# Patient Record
Sex: MALE | Race: WHITE | Employment: UNEMPLOYED | URBAN - METROPOLITAN AREA
[De-identification: names, ages, dates, MRNs, and addresses within clinical notes are randomized per-mention and may not be internally consistent; named-entity substitution may affect disease eponyms.]

---

## 2017-03-06 ENCOUNTER — TELEPHONE (OUTPATIENT)
Dept: NURSING | Facility: CLINIC | Age: 32
End: 2017-03-06

## 2017-03-06 NOTE — TELEPHONE ENCOUNTER
"Call Type: Triage Call    Presenting Problem: Patient is having \"vomiting with blood.\"  Patient  states blood in vomit is only a scant amount, no other symptoms  other than frequent stools. Triaged for gastrointestinal  bleeding/Disposition to see provider within 8 hours.  Triage Note:  Guideline Title: Gastrointestinal Bleeding ; Nausea or Vomiting  Recommended Disposition: See Provider within 8 Hours  Original Inclination: Wanted to speak with a nurse  Override Disposition:  Intended Action: See Dr/Make Appt  Physician Contacted: No  Streaks of blood or scant blood in vomitus ?  YES  New or worsening signs and symptoms that may indicate shock ? NO  Vomited blood after nosebleed ? NO  Receiving or recently completed chemotherapy or radiation therapy AND more than  one episode of black or tarry stool, not blood streaked ? NO  Rectal foreign body with any amount of bright red bleeding ? NO  Following ingestion of toxic or caustic substance ? NO  Passing red, black or tarry material from rectum AND onset of new signs and  symptoms of hypovolemia ? NO  Unbearable abdominal/pelvic pain ? NO  Vomiting red, bloody or coffee-ground material, more than streaks of blood or  scant amount (not following nosebleed within past day) ? NO  One or more episodes of rectal bleeding (more than scant) and no symptoms of  hypovolemia ? NO  Chest discomfort associated with shortness of breath, sweating, odd heartbeats or  different heart rate, nausea, vomiting, lightheadedness, or fainting lasting 5 or  more minutes now or within the last hour ? NO  Chest pain spreading to the shoulders, neck, jaw, in one or both arms, stomach or  back lasting 5 or more minutes now or within the last hour. Pain is NOT  associated with taking a deep breath or a productive cough, movement, or touch to  a localized area. ? NO  Pressure, fullness, squeezing sensation or pain anywhere in the chest lasting 5 or  more minutes now or within the last hour. Pain " is NOT associated with taking a  deep breath or a productive cough, movement, or touch to a localized area on the  chest. ? NO  Bloody diarrhea AND has not been evaluated ? NO  Bleeding began after any gastrointestinal (GI) surgery or procedure and is lasting  longer than defined in provider specified discharge information ? NO  History of esophageal varices AND more than one episode of black or tarry stool ?  NO  Eight hours or less following abdominal or rectal injury AND having any amount  bright red rectal bleeding ? NO  Possible or known pregnancy ? NO  New or worsening signs and symptoms that may indicate shock ? NO  Following ingestion of toxic or caustic substance ? NO  Vomiting red, bloody or coffee-ground material, more than streaks of blood or  scant amount (not following nosebleed within past day) ? NO  Sudden onset vomiting following ingestion or inhalation overdose (accidental or  intentional) of illegal, prescription, nonprescription or alternative drugs ? NO  Any other cardiac signs/symptoms for more than 5 minutes, now or within last hour.  Pain is NOT associated with taking a deep breath or a productive cough, movement,  or touch to a localized area on the chest or upper body. ? NO  Vomiting associated with sudden onset of focal neurological changes (difficulty  speaking, numbness, weakness, paralysis, loss of coordination, change in vision  such as double vision or loss of visual field) ? NO  High to low (but not zero) risk of exposure to Ebola within the past 21 days ? NO  Physician Instructions:  Care Advice: Avoid drinking alcohol.  Call  if patient develops sudden shortness of breath, vomiting blood  or coffee ground material, pain in chest, neck, jaw, arm, or upper back, or  signs of shock.  Continue all prescription medication as recommended until evaluated by  provider.  Do not take aspirin, ibuprofen, ketoprofen, naproxen, etc., or other pain  relieving medications until consulting  "with provider.  CAUTIONS  SYMPTOM / CONDITION MANAGEMENT  Call provider immediately if start to vomit larger amounts of blood or  \"coffee-ground\" looking material.  List, or take, all current prescription(s), nonprescription or alternative  medication(s) to provider for evaluation.  Consider nonprescription antacids (Tums, Maalox, Mylanta, Gaviscon,  Rolaids, etc), H-2-receptor blockers (Pepcid AC, Zantac 75, Tagamet HB,  Axid HR), or a proton pump inhibitor (Prilosec OTC, Prevacid 24 HR, Zegerid  OTC) after reviewing the package information or discussing with a  pharmacist or your provider to determine which one would be safest for you  and your situation.  "

## 2017-03-16 ENCOUNTER — COMMUNICATION - HEALTHEAST (OUTPATIENT)
Dept: SCHEDULING | Facility: CLINIC | Age: 32
End: 2017-03-16

## 2018-05-02 ENCOUNTER — HOSPITAL ENCOUNTER (EMERGENCY)
Facility: CLINIC | Age: 33
Discharge: HOME | End: 2018-05-02
Attending: EMERGENCY MEDICINE | Admitting: EMERGENCY MEDICINE

## 2018-05-02 VITALS
BODY MASS INDEX: 24.36 KG/M2 | OXYGEN SATURATION: 95 % | TEMPERATURE: 98.2 F | RESPIRATION RATE: 18 BRPM | DIASTOLIC BLOOD PRESSURE: 91 MMHG | HEART RATE: 90 BPM | HEIGHT: 71 IN | WEIGHT: 174 LBS | SYSTOLIC BLOOD PRESSURE: 131 MMHG

## 2018-05-02 DIAGNOSIS — R40.4 TRANSIENT ALTERATION OF AWARENESS: ICD-10-CM

## 2018-05-02 DIAGNOSIS — F10.920 ALCOHOL INTOXICATION, UNCOMPLICATED (H): ICD-10-CM

## 2018-05-02 LAB
AMPHETAMINES UR QL SCN: NEGATIVE
ANION GAP SERPL CALCULATED.3IONS-SCNC: 8 MMOL/L (ref 3–14)
APAP SERPL-MCNC: <2 MG/L (ref 10–20)
BARBITURATES UR QL: NEGATIVE
BENZODIAZ UR QL: NEGATIVE
BUN SERPL-MCNC: 9 MG/DL (ref 7–30)
CALCIUM SERPL-MCNC: 8.4 MG/DL (ref 8.5–10.1)
CANNABINOIDS UR QL SCN: NEGATIVE
CHLORIDE SERPL-SCNC: 111 MMOL/L (ref 94–109)
CO2 SERPL-SCNC: 24 MMOL/L (ref 20–32)
COCAINE UR QL: NEGATIVE
CREAT SERPL-MCNC: 1.16 MG/DL (ref 0.66–1.25)
ETHANOL SERPL-MCNC: 0.22 G/DL
GFR SERPL CREATININE-BSD FRML MDRD: 73 ML/MIN/1.7M2
GLUCOSE SERPL-MCNC: 117 MG/DL (ref 70–99)
INTERPRETATION ECG - MUSE: NORMAL
OPIATES UR QL SCN: NEGATIVE
PCP UR QL SCN: NEGATIVE
POTASSIUM SERPL-SCNC: 4.3 MMOL/L (ref 3.4–5.3)
SODIUM SERPL-SCNC: 143 MMOL/L (ref 133–144)

## 2018-05-02 PROCEDURE — 99285 EMERGENCY DEPT VISIT HI MDM: CPT | Mod: 25

## 2018-05-02 PROCEDURE — 96360 HYDRATION IV INFUSION INIT: CPT

## 2018-05-02 PROCEDURE — 25000128 H RX IP 250 OP 636: Performed by: EMERGENCY MEDICINE

## 2018-05-02 PROCEDURE — 80307 DRUG TEST PRSMV CHEM ANLYZR: CPT | Performed by: EMERGENCY MEDICINE

## 2018-05-02 PROCEDURE — 96361 HYDRATE IV INFUSION ADD-ON: CPT

## 2018-05-02 PROCEDURE — 93005 ELECTROCARDIOGRAM TRACING: CPT

## 2018-05-02 PROCEDURE — 80329 ANALGESICS NON-OPIOID 1 OR 2: CPT | Performed by: EMERGENCY MEDICINE

## 2018-05-02 PROCEDURE — 80048 BASIC METABOLIC PNL TOTAL CA: CPT | Performed by: EMERGENCY MEDICINE

## 2018-05-02 PROCEDURE — 80320 DRUG SCREEN QUANTALCOHOLS: CPT | Performed by: EMERGENCY MEDICINE

## 2018-05-02 RX ADMIN — SODIUM CHLORIDE 1000 ML: 9 INJECTION, SOLUTION INTRAVENOUS at 16:03

## 2018-05-02 NOTE — ED NOTES
Bed: [facility]  Expected date:   Expected time:   Means of arrival:   Comments:  herbie - 519 - 33M [address]to[address]

## 2018-05-02 NOTE — ED AVS SNAPSHOT
Emergency Department    [address] [facility] [address]    Phone:  [phone]    Fax:  [phone]                                       Ze Bustamante   MRN: [id number]    Department:   Emergency Department   Date of Visit:  5/2/2018           Patient Information     Date Of Birth          1985        Your diagnoses for this visit were:     Alcohol intoxication, uncomplicated (H)     Transient alteration of awareness        You were seen by Merna Foster MD and Juanito Donnelly MD.      Follow-up Information     Follow up with Sabas Bryan MD. Schedule an appointment as soon as possible for a visit in 2 days.    Specialty:  Family Practice    Contact information:    [address]  [address]  [facility] [address] [phone]          Discharge Instructions       Please stop drinking alcohol.  This is harmful to your health.  Return to the ED if you want help with your alcohol use.  Follow up with your PCP in 1 week.      Discharge Instructions  Alcohol Intoxication    You have been seen today with alcohol intoxication. This means that you have enough alcohol in your system to impair your ability to mentally and physically function, perhaps to the extent that you were unable to care for yourself.    Generally, every Emergency Department visit should have a follow-up clinic visit with either a primary or a specialty clinic/provider. Please follow-up as instructed by your emergency provider today.    You may have come to the Emergency Department because of your intoxication, or for another reason, such as because of an injury. No matter what the case is, this visit is a  red flag  regarding alcohol use, and you should consider whether your drinking pattern is a problem for you.     You may be at risk for alcohol-related problems if:      Men: you drink more than 14 drinks per week, or more than 4 drinks per occasion.      Women: you drink more than 7 drinks per week or  more than 3 drinks per occasion.      You have black-outs.    You do things you regret while drinking.    You have legal problems because of drinking.    You have job problems because of drinking (you call in sick to work because of drinking).    CAGE Questions    Have you ever felt you should cut down on your drinking?    Have people annoyed you by criticizing your drinking?    Have you ever felt bad or guilty about your drinking?    Have you ever had a drink first thing in the morning to steady your nerves or get rid of a hangover (eye opener)?    If you answer yes to any of the CAGE questions, you may have a problem with alcohol.      Return to the Emergency Department if:    You become shaky or tremble when you try to stop drinking.     You have severe abdominal pain (belly pain).     You have a seizure or pass out.      You vomit (throw up) blood or have blood in your stool. This may be bright red or it may look like black coffee grounds.    You become lightheaded or faint.      For further help, contact:     Your caregiver.      Alcoholics Anonymous (AA).    o [facility]: [phone]  o [facility] Central Office: [phone]     A drug or alcohol rehabilitation program.      You can get information on alcohol resources and groups by calling the number 626 or [phone] on any phone.     Seek medical care if:    You have persistent vomiting.     You have persistent pain in any part of your body.      You do not feel better after a few days.    If you were given a prescription for medicine here today, be sure to read all of the information (including the package insert) that comes with your prescription.  This will include important information about the medicine, its side effects, and any warnings that you need to know about.  The pharmacist who fills the prescription can provide more information and answer questions you may have about the medicine.  If you have  questions or concerns that the pharmacist cannot address, please call or return to the Emergency Department.   Remember that you can always come back to the Emergency Department if you are not able to see your regular doctor in the amount of time listed above, if you get any new symptoms, or if there is anything that worries you.      24 Hour Appointment Hotline       To make an appointment at any [facility], call [phone] ([phone]). If you don't have a family doctor or clinic, we will help you find one. The [facility] are conveniently located to serve the needs of you and your family.             Review of your medicines      Our records show that you are taking the medicines listed below. If these are incorrect, please call your family doctor or clinic.        Dose / Directions Last dose taken    CLONAZEPAM PO        Refills:  0        HYDROXYZINE HCL PO        Refills:  0        MIRTAZAPINE PO        Take by mouth At Bedtime   Refills:  0        PROPRANOLOL HCL PO        Refills:  0        QUETIAPINE FUMARATE PO        Refills:  0        VENLAFAXINE HCL PO        Refills:  0                Procedures and tests performed during your visit     Acetaminophen level    Alcohol ethyl    Basic metabolic panel    Drug abuse screen 77 urine (FL, RH, SH)    EKG 12-lead, tracing only      Orders Needing Specimen Collection     None      Pending Results     No orders found from 4/30/2018 to 5/3/2018.            Pending Culture Results     No orders found from 4/30/2018 to 5/3/2018.            Pending Results Instructions     If you had any lab results that were not finalized at the time of your Discharge, you can call the ED Lab Result RN at [phone]. You will be contacted by this team for any positive Lab results or changes in treatment. The nurses are available 7 days a week from 10A to 6:30P.  You can leave a message 24 hours per day and they will return your call.        Test Results From Your  Hospital Stay        5/2/2018  2:45 PM      Component Results     Component Value Ref Range & Units Status    Sodium 143 133 - 144 mmol/L Final    Potassium 4.3 3.4 - 5.3 mmol/L Final    Chloride 111 (H) 94 - 109 mmol/L Final    Carbon Dioxide 24 20 - 32 mmol/L Final    Anion Gap 8 3 - 14 mmol/L Final    Glucose 117 (H) 70 - 99 mg/dL Final    Urea Nitrogen 9 7 - 30 mg/dL Final    Creatinine 1.16 0.66 - 1.25 mg/dL Final    GFR Estimate 73 >60 mL/min/1.7m2 Final    Non  GFR Calc    GFR Estimate If Black 89 >60 mL/min/1.7m2 Final    African American GFR Calc    Calcium 8.4 (L) 8.5 - 10.1 mg/dL Final         5/2/2018  3:22 PM      Component Results     Component Value Ref Range & Units Status    Acetaminophen Level <2 mg/L Final    Therapeutic range: 10-20 mg/L         5/2/2018  2:45 PM      Component Results     Component Value Ref Range & Units Status    Ethanol g/dL 0.22 (H) <0.01 g/dL Final         5/2/2018  7:35 PM      Component Results     Component Value Ref Range & Units Status    Amphetamine Qual Urine Negative NEG^Negative Final    Cutoff for a negative amphetamine is 500 ng/mL or less.    Barbiturates Qual Urine Negative NEG^Negative Final    Cutoff for a negative barbiturate is 200 ng/mL or less.    Benzodiazepine Qual Urine Negative NEG^Negative Final    Cutoff for a negative benzodiazepine is 200 ng/mL or less.    Cannabinoids Qual Urine Negative NEG^Negative Final    Cutoff for a negative cannabinoid is 50 ng/mL or less.    Cocaine Qual Urine Negative NEG^Negative Final    Cutoff for a negative cocaine is 300 ng/mL or less.    Opiates Qualitative Urine Negative NEG^Negative Final    Cutoff for a negative opiate is 300 ng/mL or less.    PCP Qual Urine Negative NEG^Negative Final    Cutoff for a negative PCP is 25 ng/mL or less.                Clinical Quality Measure: Blood Pressure Screening     Your blood pressure was checked while you were in the emergency department today. The last  "reading we obtained was  BP: (!) 131/91 . Please read the guidelines below about what these numbers mean and what you should do about them.  If your systolic blood pressure (the top number) is less than 120 and your diastolic blood pressure (the bottom number) is less than 80, then your blood pressure is normal. There is nothing more that you need to do about it.  If your systolic blood pressure (the top number) is 120-139 or your diastolic blood pressure (the bottom number) is 80-89, your blood pressure may be higher than it should be. You should have your blood pressure rechecked within a year by a primary care provider.  If your systolic blood pressure (the top number) is 140 or greater or your diastolic blood pressure (the bottom number) is 90 or greater, you may have high blood pressure. High blood pressure is treatable, but if left untreated over time it can put you at risk for heart attack, stroke, or kidney failure. You should have your blood pressure rechecked by a primary care provider within the next 4 weeks.  If your provider in the emergency department today gave you specific instructions to follow-up with your doctor or provider even sooner than that, you should follow that instruction and not wait for up to 4 weeks for your follow-up visit.        Thank you for choosing [facility]       Thank you for choosing [facility] for your care. Our goal is always to provide you with excellent care. Hearing back from our patients is one way we can continue to improve our services. Please take a few minutes to complete the written survey that you may receive in the mail after you visit with us. Thank you!        ShowMe [employer] Information     [employer] lets you send messages to your doctor, view your test results, renew your prescriptions, schedule appointments and more. To sign up, go to [email or web address] . Click on \"Log in\" on the left side of the screen, which will take you to the Welcome page. Then click on \"Sign " "up Now\" on the right side of the page.     You will be asked to enter the access code listed below, as well as some personal information. Please follow the directions to create your username and password.     Your access code is: [email or web address]  Expires: 2018  8:04 PM     Your access code will  in 90 days. If you need help or a new code, please call your [facility] clinic or [phone].        Care EveryWhere ID     This is your Care EveryWhere ID. This could be used by other organizations to access your [facility] medical records  [id number]        Equal Access to Services     [facility] : Solitario Hope, danny jones, georgie charlton, tran hernandez . So [facility] [phone].    ATENCIÓN: Si habla español, tiene a dejesus disposición servicios gratuitos de asistencia lingüística. Llame al [phone].    We comply with applicable federal civil rights laws and [place] laws. We do not discriminate on the basis of race, color, national origin, age, disability, sex, sexual orientation, or gender identity.            After Visit Summary       This is your record. Keep this with you and show to your community pharmacist(s) and doctor(s) at your next visit.                  "

## 2018-05-02 NOTE — ED PROVIDER NOTES
"[facility] ED Behavioral Health Handoff Note:       Brief HPI:  This is a 33 year old male signed out to me by Dr. Foster .  See initial ED Provider note for details of the presentation.     Patient is not medically cleared for admission to a Behavioral Health unit.      [place] airport.  Took psych medications with plenty of alcohol.  Unclear intent to harm self or others.  Refused by [facility].  Was going to [employer] for work related event.      Pending studies include none.      The patient is on a hold.  The type of hold is FANTASMA.      The patient has not required medication for agitation.      Exam:   Patient Vitals for the past 24 hrs:   BP Temp Temp src Pulse Resp SpO2 Height Weight   05/02/18 1900 - - - - - 98 % - -   05/02/18 1800 95/58 - - 83 - 96 % - -   05/02/18 1740 - - - 89 18 96 % - -   05/02/18 1600 98/63 - - 88 16 94 % - -   05/02/18 1500 99/63 - - 86 - 95 % - -   05/02/18 1458 102/67 - - 89 14 94 % - -   05/02/18 1300 - - - 94 14 - - -   05/02/18 1258 - - - - - 91 % - -   05/02/18 1257 - - - - - 91 % - -   05/02/18 1256 - - - - - 91 % - -   05/02/18 1255 - - - - - 91 % - -   05/02/18 1254 - - - - - 91 % - -   05/02/18 1237 (!) 131/99 98.2  F (36.8  C) Oral 99 16 94 % 1.803 m (5' 11\") 78.9 kg (174 lb)     General: Resting comfortably on the gurney  Head:  The scalp, face, and head appear normal  Eyes:  The pupils are normal    Conjunctivae and sclera appear normal  ENT:    The nose is normal    Ears/pinnae are normal  Neck:  Normal range of motion  MS:  No defomities to extremities.  Skin:  No rash or lesions noted.  Neuro: Gait normal. Speech is normal and fluent  Psych:  Awake. Alert.  Normal affect.      Appropriate interactions    ED Course:    There were no significant events while under my care.        Patient clinically sobered here in the emergency department and was appropriate for discharge after appropriate oral challenge.  Patient discharged home with plans for counseling with primary care to discuss " alcohol cessation techniques.  All questions answered prior to discharge.  Discharged home.    Impression:    ICD-10-CM    1. Alcohol intoxication, uncomplicated (H) F10.920 Drug abuse screen 77 urine (FL, RH, SH)   2. Transient alteration of awareness R40.4        Plan:    1.Recheck SI/HI once sober.  No DEC evaluation needed unless concern for SI.  Likely discharge home      RESULTS:   Results for orders placed or performed during the hospital encounter of 05/02/18 (from the past 24 hour(s))   EKG 12-lead, tracing only     Status: None    Collection Time: 05/02/18  1:57 PM   Result Value Ref Range    Interpretation ECG Click View Image link to view waveform and result    Basic metabolic panel     Status: Abnormal    Collection Time: 05/02/18  2:05 PM   Result Value Ref Range    Sodium 143 133 - 144 mmol/L    Potassium 4.3 3.4 - 5.3 mmol/L    Chloride 111 (H) 94 - 109 mmol/L    Carbon Dioxide 24 20 - 32 mmol/L    Anion Gap 8 3 - 14 mmol/L    Glucose 117 (H) 70 - 99 mg/dL    Urea Nitrogen 9 7 - 30 mg/dL    Creatinine 1.16 0.66 - 1.25 mg/dL    GFR Estimate 73 >60 mL/min/1.7m2    GFR Estimate If Black 89 >60 mL/min/1.7m2    Calcium 8.4 (L) 8.5 - 10.1 mg/dL   Acetaminophen level     Status: None    Collection Time: 05/02/18  2:05 PM   Result Value Ref Range    Acetaminophen Level <2 mg/L   Alcohol ethyl     Status: Abnormal    Collection Time: 05/02/18  2:05 PM   Result Value Ref Range    Ethanol g/dL 0.22 (H) <0.01 g/dL   Drug abuse screen 77 urine (FL, RH, SH)     Status: None    Collection Time: 05/02/18  7:00 PM   Result Value Ref Range    Amphetamine Qual Urine Negative NEG^Negative    Barbiturates Qual Urine Negative NEG^Negative    Benzodiazepine Qual Urine Negative NEG^Negative    Cannabinoids Qual Urine Negative NEG^Negative    Cocaine Qual Urine Negative NEG^Negative    Opiates Qualitative Urine Negative NEG^Negative    PCP Qual Urine Negative NEG^Negative             Juanito Zabala,  MD  05/02/18 1950

## 2018-05-02 NOTE — DISCHARGE INSTRUCTIONS
Please stop drinking alcohol.  This is harmful to your health.  Return to the ED if you want help with your alcohol use.  Follow up with your PCP in 1 week.      Discharge Instructions  Alcohol Intoxication    You have been seen today with alcohol intoxication. This means that you have enough alcohol in your system to impair your ability to mentally and physically function, perhaps to the extent that you were unable to care for yourself.    Generally, every Emergency Department visit should have a follow-up clinic visit with either a primary or a specialty clinic/provider. Please follow-up as instructed by your emergency provider today.    You may have come to the Emergency Department because of your intoxication, or for another reason, such as because of an injury. No matter what the case is, this visit is a  red flag  regarding alcohol use, and you should consider whether your drinking pattern is a problem for you.     You may be at risk for alcohol-related problems if:      Men: you drink more than 14 drinks per week, or more than 4 drinks per occasion.      Women: you drink more than 7 drinks per week or more than 3 drinks per occasion.      You have black-outs.    You do things you regret while drinking.    You have legal problems because of drinking.    You have job problems because of drinking (you call in sick to work because of drinking).    CAGE Questions    Have you ever felt you should cut down on your drinking?    Have people annoyed you by criticizing your drinking?    Have you ever felt bad or guilty about your drinking?    Have you ever had a drink first thing in the morning to steady your nerves or get rid of a hangover (eye opener)?    If you answer yes to any of the CAGE questions, you may have a problem with alcohol.      Return to the Emergency Department if:    You become shaky or tremble when you try to stop drinking.     You have severe abdominal pain (belly pain).     You have a seizure or  pass out.      You vomit (throw up) blood or have blood in your stool. This may be bright red or it may look like black coffee grounds.    You become lightheaded or faint.      For further help, contact:     Your caregiver.      Alcoholics Anonymous (AA).    o [facility] Intergroup: [phone]  o [facility] Central Office: [phone]     A drug or alcohol rehabilitation program.      You can get information on alcohol resources and groups by calling the number 211 or [phone] on any phone.     Seek medical care if:    You have persistent vomiting.     You have persistent pain in any part of your body.      You do not feel better after a few days.    If you were given a prescription for medicine here today, be sure to read all of the information (including the package insert) that comes with your prescription.  This will include important information about the medicine, its side effects, and any warnings that you need to know about.  The pharmacist who fills the prescription can provide more information and answer questions you may have about the medicine.  If you have questions or concerns that the pharmacist cannot address, please call or return to the Emergency Department.   Remember that you can always come back to the Emergency Department if you are not able to see your regular doctor in the amount of time listed above, if you get any new symptoms, or if there is anything that worries you.

## 2018-05-02 NOTE — ED PROVIDER NOTES
"  History     Chief Complaint:  Alcohol Intoxication    HPI   Ze Bustamante is a 33 year old male who presents to the emergency department today for evaluation of alcohol intoxication. EMS reports the patient was the airport trying to board a flight and staff would not let him on the plane. EMS states that the patient told them he drank 3 beers and 3 bottles of liquor before his flight. The patient states he was drinking for 5 hours at the airport and had 2 drinks. He reports that he did not take anything else. He states that he is from [place] and was here for work but [facility]. He denies any chest pain, shortness of breath, numbness, headache, or other pain. He states that he is tired because he did not sleep last night. He denies any suicidal or homicidal ideation.     Allergies:  No Known Drug Allergies    Medications:    Medications reviewed. No current medications.     Past Medical History:    Medical history reviewed. No pertinent medical history.    Past Surgical History:    Surgical history reviewed. No pertinent surgical history.    Family History:    Family history reviewed. No pertinent family history.     Social History:  Smoking Status: Current Every Day Smoker  Alcohol Use: Positive    Review of Systems   All other systems reviewed and are negative.      Physical Exam     Patient Vitals for the past 24 hrs:   BP Temp Temp src Pulse Resp SpO2 Height Weight   05/02/18 1458 102/67 - - 89 14 94 % - -   05/02/18 1300 - - - 94 14 - - -   05/02/18 1258 - - - - - 91 % - -   05/02/18 1257 - - - - - 91 % - -   05/02/18 1256 - - - - - 91 % - -   05/02/18 1255 - - - - - 91 % - -   05/02/18 1254 - - - - - 91 % - -   05/02/18 1237 (!) 131/99 98.2  F (36.8  C) Oral 99 16 94 % 1.803 m (5' 11\") 78.9 kg (174 lb)         Physical Exam  Physical Exam   General:  Sitting on bed sleeping but arouses to voice  Head:  No obvious trauma to head  Ears, Nose:  External ears " normal.  Nose normal.   Eyes:  Conjunctivae clear.  Pupils round and symmetry.    Neck:  Normal range of motion. Neck supple and symmetric.    Pulm/Chest:  No respiratory distress.  Breathing comfortably.    CV: Regular rate and rhythm.    Neuro:  Sleeping but arouses to voice.  Moving all extremities.    Skin:  Skin is warm and dry.    Psych:  Normal mood and affect. Behavior is normal.  denies SI or HI.      Emergency Department Course     ECG:  ECG taken at 1357, ECG read at 1412  Normal sinus rhythm  Normal ECG  Rate 91 bpm. FL interval 144 ms. QRS duration 92 ms. QT/QTc 366/450 ms. P-R-T axes 31 22 6.    Laboratory:  Laboratory findings were communicated with the patient who voiced understanding of the findings.    Acetaminophen:  <2  Alcohol Ethyl: 0.22 (H)  BMP: Glucose 117 (H), Chloride 111 (H), Calcium 8.4 (L) o/w WNL (Creatinine 1.16)  Drug abuse screen 77 urine: Pending    Emergency Department Course:    1248 Nursing notes and vitals reviewed.    1325 I performed an exam of the patient as documented above.     1409 IV was inserted and blood was drawn for laboratory testing, results above.    I personally reviewed the laboratory results with the patient and answered all related questions.    Impression & Plan      Medical Decision Making:  Ze Bustamante is a 33 year old male with a history of mental illness, anxiety who presents to the emergency department today for evaluation of alcohol intoxication. Patient reports drinking several beverages prior to trying to board a plane to [place]. He reports that he took only his home psych medication. He also reports drinking several beverages. Vitals signs are unremarkable. Patient does arouse to verbal stimuli. He is otherwise well appearing nontoxic. There is no evidence of trauma on examination. Brief ingestion workup was undertaken given how sleepy that patient was. BMP shows no electrolyte, metabolic, or renal abnormalities. No evidence of acidosis to  suggest ingestion. Tylenol levels shows <2. Alcohol level is 0.22 which is consistent with his intoxication at this time. Urine drug screen is pending. Patient denies any self harm behaviors or thoughts. He arouses to verbal stimuli but does not appear to be clinically sober at this time. Patient will be signed out to my partner pending clinical sobriety. Once clinical sober, plan to reassess and likely discharge to home. No other acute events during my care.     Diagnosis:    ICD-10-CM    1. Alcohol intoxication, uncomplicated (H) F10.920    2. Transient alteration of awareness R40.4      Disposition:   Patient signed out to Dr. Donnelly.    Scribe Disclosure:  I, Keli Kennedy, am serving as a scribe at 12:50 PM on 5/2/2018 to document services personally performed by Merna Foster MD based on my observations and the provider's statements to me.     EMERGENCY DEPARTMENT       Merna Foster MD  05/02/18 3561

## 2018-05-02 NOTE — ED AVS SNAPSHOT
Emergency Department    [address] [place] [id number]    Phone:  [phone]    Fax:  [phone]                                       Ze Bustamante   MRN: [id number]    Department:   Emergency Department   Date of Visit:  5/2/2018           After Visit Summary Signature Page     I have received my discharge instructions, and my questions have been answered. I have discussed any challenges I see with this plan with the nurse or doctor.    ..........................................................................................................................................  Patient/Patient Representative Signature      ..........................................................................................................................................  Patient Representative Print Name and Relationship to Patient    ..................................................               ................................................  Date                                            Time    ..........................................................................................................................................  Reviewed by Signature/Title    ...................................................              ..............................................  Date                                                            Time